# Patient Record
Sex: FEMALE | Race: AMERICAN INDIAN OR ALASKA NATIVE | NOT HISPANIC OR LATINO | Employment: FULL TIME | URBAN - METROPOLITAN AREA
[De-identification: names, ages, dates, MRNs, and addresses within clinical notes are randomized per-mention and may not be internally consistent; named-entity substitution may affect disease eponyms.]

---

## 2024-01-24 ENCOUNTER — HOSPITAL ENCOUNTER (EMERGENCY)
Facility: HOSPITAL | Age: 29
Discharge: HOME | End: 2024-01-24
Attending: EMERGENCY MEDICINE | Admitting: EMERGENCY MEDICINE
Payer: COMMERCIAL

## 2024-01-24 VITALS
RESPIRATION RATE: 20 BRPM | DIASTOLIC BLOOD PRESSURE: 92 MMHG | OXYGEN SATURATION: 99 % | HEART RATE: 99 BPM | SYSTOLIC BLOOD PRESSURE: 135 MMHG | WEIGHT: 216 LBS | TEMPERATURE: 98.6 F

## 2024-01-24 DIAGNOSIS — L29.9 PRURITUS OF SCALP: ICD-10-CM

## 2024-01-24 DIAGNOSIS — T78.40XA ALLERGIC REACTION, INITIAL ENCOUNTER: ICD-10-CM

## 2024-01-24 PROCEDURE — 99283 EMERGENCY DEPT VISIT LOW MDM: CPT

## 2024-01-24 PROCEDURE — 250N000013 HC RX MED GY IP 250 OP 250 PS 637: Performed by: EMERGENCY MEDICINE

## 2024-01-24 RX ORDER — HYDROCORTISONE 25 MG/G
OINTMENT TOPICAL 2 TIMES DAILY
Qty: 30 G | Refills: 1 | Status: SHIPPED | OUTPATIENT
Start: 2024-01-24

## 2024-01-24 RX ORDER — DIPHENHYDRAMINE HCL 50 MG
50 CAPSULE ORAL ONCE
Status: COMPLETED | OUTPATIENT
Start: 2024-01-24 | End: 2024-01-24

## 2024-01-24 RX ADMIN — DIPHENHYDRAMINE HYDROCHLORIDE 50 MG: 50 CAPSULE ORAL at 13:01

## 2024-01-24 NOTE — ED PROVIDER NOTES
EMERGENCY DEPARTMENT ENCOUNTER      NAME: Gilma Gunderson  AGE: 28 year old female  YOB: 1995  MRN: [id number]  EVALUATION DATE & TIME: No admission date for patient encounter.    PCP: No primary care provider on file.    ED PROVIDER: Breonna Kohli MD    Chief Complaint   Patient presents with    Allergic Reaction         FINAL IMPRESSION:  1. Allergic reaction, initial encounter    2. Pruritus of scalp          ED COURSE & MEDICAL DECISION MAKING:    Pertinent Labs & Imaging studies reviewed. (See chart for details)  28 year old female with history of mental health issues on [facility] who presents to the Emergency Department for evaluation of scalp itchy rash after she used a new product on her scalp last night.  Exam shows excoriated urticarial rash on the scalp consistent with allergic reaction.  It does not sound like this was really chemicals I do not think that this is chemical burn but rather a contact dermatitis.  Was given dose of Benadryl here, home with hydrocortisone ointment to apply.  Recommend discontinue use of this product.         12:23 PM I met with the patient, obtained history, performed an initial exam, and discussed options and plan for diagnostics and treatment here in the ED.     Medical Decision Making    History:  Supplemental history from: Documented in chart  External Record(s) reviewed: Documented in chart    Work Up:  Chart documentation includes differential considered and any EKGs or imaging independently interpreted by provider, see MDM  In additional to work up documented, I considered the following work up: see MDM    External consultation:  Discussion of management with another provider: N/A    Complicating factors:  Care impacted by chronic illness: Mental health  Care affected by social determinants of health: Access to care no PCP    Disposition considerations: Discharge. I prescribed additional prescription strength medication(s) as charted. See  documentation for any additional details.        At the conclusion of the encounter I discussed the results of all of the tests and the disposition. The questions were answered. The patient or family acknowledged understanding and was agreeable with the care plan.      MEDICATIONS GIVEN IN THE EMERGENCY:  Medications   diphenhydrAMINE (BENADRYL) capsule 50 mg (has no administration in time range)       NEW PRESCRIPTIONS STARTED AT TODAY'S ER VISIT  New Prescriptions    HYDROCORTISONE 2.5 % OINTMENT    Apply topically 2 times daily          =================================================================    HPI    Patient information was obtained from: patient    Use of Intrepreter: N/A        Gilmaroberta Gunderson is a 28 year old female with no pertinent medical history who presents with an allergic reaction.    The patient reports here with a itchy rash to her scalp that started this morning.  She reports using a new honey infused moisturizer on her scalp and hair last night, before symptoms onset.  She denies any other rashes. She has no known allergies.  She has not taken any medications for the rash.  She denies any other complaints at this time.    PAST MEDICAL HISTORY:  No past medical history on file.    PAST SURGICAL HISTORY:  No past surgical history on file.    CURRENT MEDICATIONS:    Cannot display prior to admission medications because the patient has not been admitted in this contact.       ALLERGIES:  No Known Allergies    FAMILY HISTORY:  No family history on file.    SOCIAL HISTORY:        VITALS:  Patient Vitals for the past 24 hrs:   BP Temp Temp src Pulse Resp SpO2 Weight   01/24/24 1218 (!) 153/101 98.6  F (37  C) Oral 114 18 99 % 98 kg (216 lb)       PHYSICAL EXAM    General Appearance: Well-appearing, well-nourished, no acute distress   Head:  Normocephalic  Cardio:  Tachycardic, Regular rhythm  Pulm:  No respiratory distress  Extremities: Normal gait  Skin:  Skin warm, dry, diffuse  urticarial excoriated rash at the scalp line and diffusely throughout scalp  Neuro:  Alert and oriented ×3, moving all extremities, no gross sensory defects         The creation of this record is based on the scribe s observations of the work being performed by Breonna Kohli MD and the provider s statements to them. It was created on her behalf by Alexi Merritt, a trained medical scribe. This document has been checked and approved by the attending provider.    Breonna Kohli MD  Emergency Medicine  [facility] EMERGENCY DEPARTMENT  [address] [place] [id number] [phone]  Dept: [phone]      Breonna Kohli MD  01/24/24 7739

## 2024-01-24 NOTE — ED TRIAGE NOTES
Pt used a new product on her scalp yesterday and developed a painful rash, swelling, and burning on her scalp. Pt washed the product off. Tylenol taken before arrival to ED.      Triage Assessment (Adult)       Row Name 01/24/24 1216          Triage Assessment    Airway WDL WDL        Respiratory WDL    Respiratory WDL WDL        Skin Circulation/Temperature WDL    Skin Circulation/Temperature WDL X  scalp redness, burning        Cardiac WDL    Cardiac WDL WDL        Peripheral/Neurovascular WDL    Peripheral Neurovascular WDL WDL        Cognitive/Neuro/Behavioral WDL    Cognitive/Neuro/Behavioral WDL WDL